# Patient Record
Sex: FEMALE | Race: WHITE | NOT HISPANIC OR LATINO | Employment: OTHER | URBAN - METROPOLITAN AREA
[De-identification: names, ages, dates, MRNs, and addresses within clinical notes are randomized per-mention and may not be internally consistent; named-entity substitution may affect disease eponyms.]

---

## 2023-11-28 DIAGNOSIS — I10 HYPERTENSION, UNSPECIFIED TYPE: Primary | ICD-10-CM

## 2023-11-28 RX ORDER — DIGOXIN 125 MCG
125 TABLET ORAL
COMMUNITY
Start: 2023-05-22 | End: 2023-11-28 | Stop reason: SDUPTHER

## 2023-11-28 RX ORDER — DIGOXIN 125 MCG
125 TABLET ORAL
Qty: 90 TABLET | Refills: 3 | Status: SHIPPED | OUTPATIENT
Start: 2023-11-28 | End: 2024-11-27

## 2023-12-04 DIAGNOSIS — I10 HYPERTENSION, UNSPECIFIED TYPE: ICD-10-CM

## 2023-12-05 NOTE — TELEPHONE ENCOUNTER
CALLED [facility] FOR A 2 WEEK SUPPLY UNTIL MAIL ORDER COMES SPOKE WITH PHARMACIST AYSHA LEFT VOICEMAIL TO INFORM PATIENT [facility] [address] WILL HAVE HER MEDICATION

## 2024-01-25 PROBLEM — H90.3 BILATERAL SENSORINEURAL HEARING LOSS: Status: ACTIVE | Noted: 2024-01-25

## 2024-01-25 PROBLEM — M79.89 SWELLING OF LOWER EXTREMITY: Status: ACTIVE | Noted: 2024-01-25

## 2024-01-25 PROBLEM — I49.3 PVC'S (PREMATURE VENTRICULAR CONTRACTIONS): Status: ACTIVE | Noted: 2024-01-25

## 2024-01-25 PROBLEM — E78.00 PURE HYPERCHOLESTEROLEMIA: Status: ACTIVE | Noted: 2024-01-25

## 2024-01-25 PROBLEM — M19.90 OSTEOARTHRITIS: Status: ACTIVE | Noted: 2024-01-25

## 2024-01-25 PROBLEM — D12.6 ADENOMATOUS POLYP OF COLON: Status: ACTIVE | Noted: 2024-01-25

## 2024-01-25 PROBLEM — I10 HYPERTENSION: Status: ACTIVE | Noted: 2024-01-25

## 2024-01-25 PROBLEM — M20.40 HAMMER TOE: Status: ACTIVE | Noted: 2024-01-25

## 2024-01-25 PROBLEM — M25.569 KNEE PAIN: Status: ACTIVE | Noted: 2024-01-25

## 2024-01-25 PROBLEM — I48.91 ATRIAL FIBRILLATION (MULTI): Status: ACTIVE | Noted: 2024-01-25

## 2024-01-25 PROBLEM — I31.39 PERICARDIAL EFFUSION (HHS-HCC): Status: ACTIVE | Noted: 2024-01-25

## 2024-01-25 PROBLEM — M79.673 FOOT PAIN: Status: ACTIVE | Noted: 2024-01-25

## 2024-01-25 PROBLEM — R63.4 WEIGHT LOSS: Status: ACTIVE | Noted: 2024-01-25

## 2024-01-25 PROBLEM — R55 SYNCOPE: Status: ACTIVE | Noted: 2024-01-25

## 2024-01-25 PROBLEM — H61.21 IMPACTED CERUMEN OF RIGHT EAR: Status: ACTIVE | Noted: 2024-01-25

## 2024-01-25 PROBLEM — M20.10 HALLUX VALGUS: Status: ACTIVE | Noted: 2024-01-25

## 2024-01-25 PROBLEM — Q43.8 TORTUOUS COLON: Status: ACTIVE | Noted: 2024-01-25

## 2024-01-25 PROBLEM — Z85.3 HISTORY OF LEFT BREAST CANCER: Status: ACTIVE | Noted: 2024-01-25

## 2024-01-31 ENCOUNTER — OFFICE VISIT (OUTPATIENT)
Dept: CARDIOLOGY | Facility: CLINIC | Age: 89
End: 2024-01-31
Payer: MEDICARE

## 2024-01-31 VITALS
OXYGEN SATURATION: 97 % | BODY MASS INDEX: 23.44 KG/M2 | SYSTOLIC BLOOD PRESSURE: 150 MMHG | DIASTOLIC BLOOD PRESSURE: 82 MMHG | WEIGHT: 120 LBS | HEART RATE: 74 BPM

## 2024-01-31 DIAGNOSIS — J90 PLEURAL EFFUSION: ICD-10-CM

## 2024-01-31 DIAGNOSIS — I49.3 PVC'S (PREMATURE VENTRICULAR CONTRACTIONS): ICD-10-CM

## 2024-01-31 DIAGNOSIS — I10 HYPERTENSION, UNSPECIFIED TYPE: Primary | ICD-10-CM

## 2024-01-31 DIAGNOSIS — M79.89 SWELLING OF LOWER EXTREMITY: ICD-10-CM

## 2024-01-31 DIAGNOSIS — I31.39 PERICARDIAL EFFUSION (HHS-HCC): ICD-10-CM

## 2024-01-31 DIAGNOSIS — E78.00 PURE HYPERCHOLESTEROLEMIA: ICD-10-CM

## 2024-01-31 DIAGNOSIS — I48.91 ATRIAL FIBRILLATION, UNSPECIFIED TYPE (MULTI): ICD-10-CM

## 2024-01-31 PROCEDURE — 1159F MED LIST DOCD IN RCRD: CPT | Performed by: INTERNAL MEDICINE

## 2024-01-31 PROCEDURE — 1036F TOBACCO NON-USER: CPT | Performed by: INTERNAL MEDICINE

## 2024-01-31 PROCEDURE — 3079F DIAST BP 80-89 MM HG: CPT | Performed by: INTERNAL MEDICINE

## 2024-01-31 PROCEDURE — 3077F SYST BP >= 140 MM HG: CPT | Performed by: INTERNAL MEDICINE

## 2024-01-31 PROCEDURE — 1160F RVW MEDS BY RX/DR IN RCRD: CPT | Performed by: INTERNAL MEDICINE

## 2024-01-31 PROCEDURE — 99214 OFFICE O/P EST MOD 30 MIN: CPT | Performed by: INTERNAL MEDICINE

## 2024-01-31 RX ORDER — METOPROLOL TARTRATE 50 MG/1
25 TABLET ORAL 2 TIMES DAILY
COMMUNITY
Start: 2023-02-18

## 2024-01-31 RX ORDER — PREDNISONE 2.5 MG/1
2.5 TABLET ORAL DAILY
COMMUNITY
Start: 2023-02-03

## 2024-01-31 RX ORDER — FUROSEMIDE 20 MG/1
20 TABLET ORAL
COMMUNITY
Start: 2023-12-26 | End: 2024-01-31 | Stop reason: ALTCHOICE

## 2024-01-31 RX ORDER — DUPILUMAB 300 MG/2ML
300 INJECTION, SOLUTION SUBCUTANEOUS
COMMUNITY
Start: 2023-02-16

## 2024-01-31 RX ORDER — ASPIRIN 81 MG/1
81 TABLET ORAL
COMMUNITY
Start: 2023-11-08

## 2024-01-31 RX ORDER — ATORVASTATIN CALCIUM 10 MG/1
10 TABLET, FILM COATED ORAL DAILY
COMMUNITY
Start: 2023-12-15

## 2024-01-31 RX ORDER — DENOSUMAB 60 MG/ML
60 INJECTION SUBCUTANEOUS
COMMUNITY
Start: 2023-02-03

## 2024-01-31 RX ORDER — POTASSIUM CHLORIDE 750 MG/1
TABLET, EXTENDED RELEASE ORAL
COMMUNITY
Start: 2024-01-26 | End: 2024-01-31 | Stop reason: ALTCHOICE

## 2024-01-31 RX ORDER — MULTIVITAMIN
TABLET ORAL
COMMUNITY

## 2024-01-31 RX ORDER — MULTIVIT-MIN/FA/LYCOPEN/LUTEIN .4-300-25
1 TABLET ORAL DAILY
COMMUNITY

## 2024-01-31 RX ORDER — DOXYCYCLINE 100 MG/1
CAPSULE ORAL
COMMUNITY
Start: 2024-01-29

## 2024-01-31 NOTE — PROGRESS NOTES
Chief Complaint:   Follow-up (Patient states she is here for a 6 month follow up.)     History Of Present Illness:    Mihaela Calderon is a 89 y.o. female presenting with CV dz .  Was put on diuretic by primary care-has excess urination.  Patient denies chest pain/SOB/palpitations/dizziness/lightheadedness/edema/claudication  Active-walks to meals with walker     Last Recorded Vitals:  Vitals:    01/31/24 1103 01/31/24 1138   BP: (!) 150/100 150/82   BP Location: Right arm    Patient Position: Sitting    BP Cuff Size: Adult    Pulse: 74    SpO2: 97%    Weight: 54.4 kg (120 lb)             Allergies:  Patient has no known allergies.    Outpatient Medications:  Current Outpatient Medications   Medication Instructions    aspirin 81 mg    atorvastatin (LIPITOR) 10 mg, oral, Daily    Bacillus coagulans-inulin 1 billion-250 cell-mg capsule     calcium carbonate-vitamin D3 (Calcium 600 + D,3,) 600 mg-10 mcg (400 unit) tablet     digoxin (LANOXIN) 125 mcg, oral, Daily RT    doxycycline (Monodox) 100 mg capsule     Dupixent Syringe 300 mg    metoprolol tartrate (LOPRESSOR) 25 mg, oral, 2 times daily    multivitamin with minerals iron-free (Centrum Silver) 1 tablet, oral, Daily    predniSONE (DELTASONE) 2.5 mg, oral, Daily    Prolia 60 mg, subcutaneous       Physical Exam:  Constitutional:       General: Awake.      Appearance: Not in distress. Frail.   Neck:      Vascular: No JVR. JVD normal.   Pulmonary:      Effort: Pulmonary effort is normal.      Breath sounds: Normal breath sounds. No wheezing. No rhonchi. No rales.   Chest:      Chest wall: Not tender to palpatation.   Cardiovascular:      PMI at left midclavicular line. Normal rate. Irregularly irregular rhythm. Normal S1. Normal S2.       Murmurs: There is no murmur.      No gallop.  No click. No rub.   Pulses:     Intact distal pulses.   Edema:     Peripheral edema absent.   Abdominal:      General: Bowel sounds are normal.      Palpations: Abdomen is soft.       Tenderness: There is no abdominal tenderness.   Musculoskeletal: Normal range of motion.         General: No tenderness.      Comments: Uses walker Skin:     General: Skin is warm and dry.   Neurological:      General: No focal deficit present.      Mental Status: Alert and oriented to person, place and time.          Last Labs:  CBC -  Lab Results   Component Value Date    WBC 6.0 10/25/2019    HGB 12.6 10/25/2019    HCT 39.4 10/25/2019    MCV 97 10/25/2019     10/25/2019       CMP -  Lab Results   Component Value Date    CALCIUM 9.3 10/25/2019    PROT 6.8 10/25/2019    ALBUMIN 4.6 10/25/2019    AST 25 10/25/2019    ALT 19 10/25/2019    ALKPHOS 37 10/25/2019    BILITOT 0.5 10/25/2019       LIPID PANEL -   Lab Results   Component Value Date    CHOL 199 03/12/2019    TRIG 76 03/12/2019    HDL 72.6 03/12/2019    CHHDL 2.7 03/12/2019    LDLF 111 (H) 03/12/2019    VLDL 15 03/12/2019       RENAL FUNCTION PANEL -   Lab Results   Component Value Date    GLUCOSE 90 10/25/2019     (L) 10/25/2019    K 4.0 10/25/2019     10/25/2019    CO2 30 10/25/2019    ANIONGAP 8 (L) 10/25/2019    BUN 18 10/25/2019    CREATININE 0.84 10/25/2019    CALCIUM 9.3 10/25/2019    ALBUMIN 4.6 10/25/2019        Lab Results   Component Value Date    HGBA1C 6.2 03/12/2019           Lab review: I have personally reviewed the laboratory result(s)       Problem List Items Addressed This Visit       Hypertension - Primary    Overview     On beta blocker   BP mildly elevated in office today but generally lower at home, thus no med changes made          Pure hypercholesterolemia    Overview     No definite statin indication as age of 89 with no DM / CAD / LDL over 190           PVC's (premature ventricular contractions)    Overview     Asymptomatic - on beta blocker          Relevant Medications    metoprolol tartrate (Lopressor) 50 mg tablet    Atrial fibrillation (CMS/HCC)    Overview     Newly diagnosed on 2/15/2023 hospital admit    Asymptomatic and no signs of CHF   On digoxin / beta blocker / ASA(stopped AC with pericardial effusion)         Relevant Medications    metoprolol tartrate (Lopressor) 50 mg tablet    Pericardial effusion    Overview     Moderate per 2/16/2023 echo   Persistent per 12/2023 echo  No signs of tamponade per exam / echo   Note: did stop Eliquis  Follow clinically          Swelling of lower extremity    Overview     Improved         Pleural effusion    Overview     Asymptomatic    Minimal per 12/2023 CXR  Stop diuretic            Can stop Lasix and potassium-excess urination    Connor Parish, DO

## 2024-07-03 ENCOUNTER — APPOINTMENT (OUTPATIENT)
Dept: CARDIOLOGY | Facility: CLINIC | Age: 89
End: 2024-07-03
Payer: MEDICARE

## 2024-07-03 VITALS
DIASTOLIC BLOOD PRESSURE: 65 MMHG | SYSTOLIC BLOOD PRESSURE: 130 MMHG | BODY MASS INDEX: 25.15 KG/M2 | HEART RATE: 78 BPM | OXYGEN SATURATION: 98 % | WEIGHT: 128.8 LBS

## 2024-07-03 DIAGNOSIS — I48.91 ATRIAL FIBRILLATION, UNSPECIFIED TYPE (MULTI): ICD-10-CM

## 2024-07-03 DIAGNOSIS — I31.39 PERICARDIAL EFFUSION (HHS-HCC): ICD-10-CM

## 2024-07-03 DIAGNOSIS — Z79.82 CURRENT USE OF ASPIRIN: ICD-10-CM

## 2024-07-03 DIAGNOSIS — I26.99 PULMONARY EMBOLISM, OTHER, UNSPECIFIED CHRONICITY, UNSPECIFIED WHETHER ACUTE COR PULMONALE PRESENT (MULTI): ICD-10-CM

## 2024-07-03 DIAGNOSIS — M79.89 SWELLING OF LOWER EXTREMITY: ICD-10-CM

## 2024-07-03 DIAGNOSIS — I10 HYPERTENSION, UNSPECIFIED TYPE: Primary | ICD-10-CM

## 2024-07-03 PROBLEM — L10.9 PEMPHIGUS (MULTI): Status: ACTIVE | Noted: 2024-02-19

## 2024-07-03 PROBLEM — I50.31 ACUTE DIASTOLIC HEART FAILURE (MULTI): Status: ACTIVE | Noted: 2024-07-03

## 2024-07-03 PROBLEM — Z09 HOSPITAL DISCHARGE FOLLOW-UP: Status: ACTIVE | Noted: 2024-07-03

## 2024-07-03 PROBLEM — L65.9 HAIR LOSS: Status: ACTIVE | Noted: 2024-07-03

## 2024-07-03 PROBLEM — J30.9 ALLERGIC RHINITIS: Status: ACTIVE | Noted: 2024-07-03

## 2024-07-03 PROBLEM — M81.0 OSTEOPOROSIS: Status: ACTIVE | Noted: 2024-02-19

## 2024-07-03 PROBLEM — C50.919 MALIGNANT NEOPLASM OF BREAST (MULTI): Status: ACTIVE | Noted: 2024-02-19

## 2024-07-03 PROBLEM — D64.9 ANEMIA: Status: ACTIVE | Noted: 2024-06-15

## 2024-07-03 PROBLEM — E83.39 HYPOPHOSPHATEMIA: Status: ACTIVE | Noted: 2024-06-15

## 2024-07-03 PROCEDURE — 1036F TOBACCO NON-USER: CPT | Performed by: INTERNAL MEDICINE

## 2024-07-03 PROCEDURE — 99215 OFFICE O/P EST HI 40 MIN: CPT | Performed by: INTERNAL MEDICINE

## 2024-07-03 PROCEDURE — 3078F DIAST BP <80 MM HG: CPT | Performed by: INTERNAL MEDICINE

## 2024-07-03 PROCEDURE — 3075F SYST BP GE 130 - 139MM HG: CPT | Performed by: INTERNAL MEDICINE

## 2024-07-03 PROCEDURE — 1159F MED LIST DOCD IN RCRD: CPT | Performed by: INTERNAL MEDICINE

## 2024-07-03 RX ORDER — PANTOPRAZOLE SODIUM 40 MG/1
40 TABLET, DELAYED RELEASE ORAL
COMMUNITY
Start: 2024-06-20

## 2024-07-03 RX ORDER — PREDNISOLONE ACETATE 10 MG/ML
SUSPENSION/ DROPS OPHTHALMIC
COMMUNITY
Start: 2024-04-23 | End: 2024-07-03

## 2024-07-03 RX ORDER — MULTIVITAMIN/IRON/FOLIC ACID 18MG-0.4MG
1 TABLET ORAL
COMMUNITY
End: 2024-07-03 | Stop reason: SDUPTHER

## 2024-07-03 RX ORDER — TACROLIMUS 1 MG/G
OINTMENT TOPICAL
COMMUNITY
Start: 2022-08-01 | End: 2024-07-03 | Stop reason: WASHOUT

## 2024-07-03 RX ORDER — MOXIFLOXACIN 5 MG/ML
SOLUTION/ DROPS OPHTHALMIC
COMMUNITY
Start: 2024-04-23

## 2024-07-03 RX ORDER — MELOXICAM 15 MG/1
TABLET ORAL
COMMUNITY

## 2024-07-03 RX ORDER — LYSINE HCL 500 MG
TABLET ORAL
COMMUNITY

## 2024-07-03 RX ORDER — HYALURONATE SODIUM, STABILIZED 60 MG/3 ML
SYRINGE (ML) INTRAARTICULAR
COMMUNITY
Start: 2024-03-22 | End: 2024-07-03 | Stop reason: SDUPTHER

## 2024-07-03 RX ORDER — DEXTROMETHORPHAN HYDROBROMIDE, GUAIFENESIN 5; 100 MG/5ML; MG/5ML
LIQUID ORAL
COMMUNITY

## 2024-07-03 NOTE — PROGRESS NOTES
Cardiology Chief Complaint:   Hospital Follow-up (Patient is here for a hospital follow up)     History Of Present Illness:    Mihaela Calderon is a 89 y.o. female presenting after hospital discharge  Patient was admitted to [facility] 6/13/2024 with acute pulmonary embolism.  She was started on anticoagulation  Now feels better  Some MARIE  No CP/palpitations/dizziness/edema  Activity limited  No bleeding with Eliquis           Last Recorded Vitals:  Vitals:    07/03/24 0930 07/03/24 1003   BP: 146/79 130/65   BP Location: Right arm    Patient Position: Sitting    BP Cuff Size: Adult    Pulse: 78    SpO2: 98%    Weight: 58.4 kg (128 lb 12.8 oz)             Allergies:  Patient has no known allergies.    Outpatient Medications:  Current Outpatient Medications   Medication Instructions    acetaminophen (Tylenol 8 HOUR) 650 mg ER tablet oral    apixaban (ELIQUIS) 5 mg, oral, 2 times daily    aspirin 81 mg    atorvastatin (LIPITOR) 10 mg, oral, Daily    Bacillus coagulans-inulin 1 billion-250 cell-mg capsule     calcium carbonate-vit D3-min 600 mg calcium- 400 unit tablet     digoxin (LANOXIN) 125 mcg, oral, Daily RT    doxycycline (Monodox) 100 mg capsule     Dupixent Syringe 300 mg    lactobacillus (Culturelle) 10 billion cell capsule 1 capsule, oral, Daily RT    meloxicam (Mobic) 15 mg tablet oral    metoprolol tartrate (LOPRESSOR) 50 mg, oral, 2 times daily    moxifloxacin (Vigamox) 0.5 % ophthalmic solution INSTILL 1 DROP IN THE LEFT EYE FOUR TIMES DAILY    multivitamin with minerals iron-free (Centrum Silver) 1 tablet, oral, Daily    pantoprazole (PROTONIX) 40 mg    predniSONE (DELTASONE) 2.5 mg, oral, Daily    Prolia 60 mg, subcutaneous    vit A/vit C/vit E/zinc/copper (PRESERVISION AREDS ORAL) oral       Physical Exam:  Constitutional:       General: Awake.      Appearance: Not in distress. Frail.   Neck:      Vascular: No JVR. JVD normal.   Pulmonary:      Effort: Pulmonary effort is normal.       Breath sounds: Normal breath sounds. No wheezing. No rhonchi. No rales.   Chest:      Chest wall: Not tender to palpatation.   Cardiovascular:      PMI at left midclavicular line. Normal rate. Irregularly irregular rhythm. Normal S1. Normal S2.       Murmurs: There is no murmur.      No gallop.  No click. No rub.   Pulses:     Intact distal pulses.   Edema:     Peripheral edema absent.   Abdominal:      General: Bowel sounds are normal.      Palpations: Abdomen is soft.      Tenderness: There is no abdominal tenderness.   Musculoskeletal: Normal range of motion.         General: No tenderness.      Comments: Uses walker Skin:     General: Skin is warm and dry.   Neurological:      General: No focal deficit present.      Mental Status: Alert and oriented to person, place and time.          Last Labs:  CBC -  Lab Results   Component Value Date    WBC 6.0 10/25/2019    HGB 12.6 10/25/2019    HCT 39.4 10/25/2019    MCV 97 10/25/2019     10/25/2019       CMP -  Lab Results   Component Value Date    CALCIUM 9.3 10/25/2019    PROT 6.8 10/25/2019    ALBUMIN 4.6 10/25/2019    AST 25 10/25/2019    ALT 19 10/25/2019    ALKPHOS 37 10/25/2019    BILITOT 0.5 10/25/2019       LIPID PANEL -   Lab Results   Component Value Date    CHOL 199 03/12/2019    TRIG 76 03/12/2019    HDL 72.6 03/12/2019    CHHDL 2.7 03/12/2019    LDLF 111 (H) 03/12/2019    VLDL 15 03/12/2019       RENAL FUNCTION PANEL -   Lab Results   Component Value Date    GLUCOSE 90 10/25/2019     (L) 10/25/2019    K 4.0 10/25/2019     10/25/2019    CO2 30 10/25/2019    ANIONGAP 8 (L) 10/25/2019    BUN 18 10/25/2019    CREATININE 0.84 10/25/2019    CALCIUM 9.3 10/25/2019    ALBUMIN 4.6 10/25/2019        Lab Results   Component Value Date    HGBA1C 6.2 03/12/2019           Lab review: I have personally reviewed the laboratory result(s)       Problem List Items Addressed This Visit       Hypertension - Primary    Overview     On beta blocker   Blood  pressure stable         Relevant Orders    Follow Up In Cardiology    Atrial fibrillation (Multi)    Overview     Newly diagnosed on 2/15/2023 hospital admit   Asymptomatic and no signs of CHF   On digoxin / beta blocker /Eliquis (stopped AC with pericardial effusion-however was resumed in the setting of 6/13/2024 pulmonary embolism)         Pericardial effusion (HHS-HCC)    Overview     Moderate per 2/16/2023 echo   Persistent per 12/2023 echo-had stopped anticoagulation which she was on for A-fib.  Was restarted on anticoagulation 6/13/2024 because of pulmonary embolism  6/14/2024 echo with moderate to large pericardial effusion  No signs of tamponade per exam / echo     Follow clinically          Swelling of lower extremity    Overview     Improved         Pulmonary embolism (Multi)    Overview     Admitted to [facility] 6/13/2024 with bilateral PE.  Now on Eliquis  She is hemodynamically stable with no signs of right heart failure         Current use of aspirin    Overview     Will discontinue aspirin as she is now on Eliquis for pulmonary embolism.  In light of advanced age has high bleeding risk          Stop aspirin      Connor Parish, DO

## 2024-07-10 ENCOUNTER — EXTERNAL HOSPITAL ADMISSION (OUTPATIENT)
Dept: CARDIOLOGY | Facility: CLINIC | Age: 89
End: 2024-07-10
Payer: MEDICARE

## 2024-07-11 DIAGNOSIS — I48.91 ATRIAL FIBRILLATION, UNSPECIFIED TYPE (MULTI): ICD-10-CM

## 2024-07-17 ENCOUNTER — TELEPHONE (OUTPATIENT)
Dept: CARDIOLOGY | Facility: CLINIC | Age: 89
End: 2024-07-17
Payer: MEDICARE

## 2024-07-17 NOTE — TELEPHONE ENCOUNTER
Pt's dtr called stating pt was at [facility] ER on 7/10 and was discharged with Lasix 20mg daily. Pt had CT of chest & A/P done and wanted Dr. Parish to review to see if he had any concerns (see inbox). Pt's dtr denies SOB, and states edema is improving. Pt's next OV: 8/5/24.

## 2024-07-17 NOTE — TELEPHONE ENCOUNTER
Per Dr. Parish: Patient does have fluid around the heart and in the lungs however these are stable compared to recent testing at [facility].  As long as she is feeling better we will continue to monitor     Spoke to pt's dtr and made aware. Instructed to call office with any questions/concerns.

## 2024-07-25 DIAGNOSIS — I48.91 ATRIAL FIBRILLATION, UNSPECIFIED TYPE (MULTI): ICD-10-CM

## 2024-07-31 PROBLEM — I50.9 ACUTE ON CHRONIC CONGESTIVE HEART FAILURE (MULTI): Status: ACTIVE | Noted: 2024-07-10

## 2024-07-31 PROBLEM — K55.1 MESENTERIC ARTERY STENOSIS (MULTI): Status: ACTIVE | Noted: 2024-07-10

## 2024-07-31 PROBLEM — N39.0 UTI (URINARY TRACT INFECTION): Status: ACTIVE | Noted: 2024-07-10

## 2024-08-05 ENCOUNTER — APPOINTMENT (OUTPATIENT)
Dept: CARDIOLOGY | Facility: CLINIC | Age: 89
End: 2024-08-05
Payer: MEDICARE

## 2024-08-05 VITALS
BODY MASS INDEX: 25.97 KG/M2 | SYSTOLIC BLOOD PRESSURE: 130 MMHG | HEART RATE: 78 BPM | DIASTOLIC BLOOD PRESSURE: 70 MMHG | OXYGEN SATURATION: 95 % | WEIGHT: 133 LBS

## 2024-08-05 DIAGNOSIS — I48.91 ATRIAL FIBRILLATION, UNSPECIFIED TYPE (MULTI): ICD-10-CM

## 2024-08-05 DIAGNOSIS — I31.39 PERICARDIAL EFFUSION (HHS-HCC): ICD-10-CM

## 2024-08-05 DIAGNOSIS — Z79.82 CURRENT USE OF ASPIRIN: ICD-10-CM

## 2024-08-05 DIAGNOSIS — I26.99 PULMONARY EMBOLISM, OTHER, UNSPECIFIED CHRONICITY, UNSPECIFIED WHETHER ACUTE COR PULMONALE PRESENT (MULTI): ICD-10-CM

## 2024-08-05 DIAGNOSIS — E78.00 PURE HYPERCHOLESTEROLEMIA: Primary | ICD-10-CM

## 2024-08-05 DIAGNOSIS — I10 HYPERTENSION, UNSPECIFIED TYPE: ICD-10-CM

## 2024-08-05 PROCEDURE — 1159F MED LIST DOCD IN RCRD: CPT | Performed by: INTERNAL MEDICINE

## 2024-08-05 PROCEDURE — 99214 OFFICE O/P EST MOD 30 MIN: CPT | Performed by: INTERNAL MEDICINE

## 2024-08-05 PROCEDURE — 1036F TOBACCO NON-USER: CPT | Performed by: INTERNAL MEDICINE

## 2024-08-05 PROCEDURE — 3075F SYST BP GE 130 - 139MM HG: CPT | Performed by: INTERNAL MEDICINE

## 2024-08-05 PROCEDURE — G2211 COMPLEX E/M VISIT ADD ON: HCPCS | Performed by: INTERNAL MEDICINE

## 2024-08-05 PROCEDURE — 3078F DIAST BP <80 MM HG: CPT | Performed by: INTERNAL MEDICINE

## 2024-08-05 NOTE — PROGRESS NOTES
Cardiology Chief Complaint:   Follow-up (Patient is here for a follow up/)     History Of Present Illness:    Mihaela Calderon is a 89 y.o. female presenting with cardiovascular disease  Was seen in the emergency department [facility] 7/10/2024 with generalized weakness.  Feels tired  Has intermittent SOB-no cough/wheeze  Has had some ankle edema  No CP/palpitations/dizziness  Activity limited-uses walker/wheel chair  No bleeding with Eliquis           Last Recorded Vitals:  Vitals:    08/05/24 1015 08/05/24 1047   BP: 152/89 130/70   BP Location: Right arm    Patient Position: Sitting    BP Cuff Size: Adult    Pulse: 78    SpO2: 95%    Weight: 60.3 kg (133 lb)             Allergies:  Patient has no known allergies.    Outpatient Medications:  Current Outpatient Medications   Medication Instructions    acetaminophen (Tylenol 8 HOUR) 650 mg ER tablet oral    apixaban (ELIQUIS) 5 mg, oral, 2 times daily    calcium carbonate-vit D3-min 600 mg calcium- 400 unit tablet     digoxin (LANOXIN) 125 mcg, oral, Daily RT    doxycycline (Monodox) 100 mg capsule     Dupixent Syringe 300 mg    lactobacillus (Culturelle) 10 billion cell capsule 1 capsule, oral, Daily RT    metoprolol tartrate (LOPRESSOR) 50 mg, oral, 2 times daily    multivitamin with minerals iron-free (Centrum Silver) 1 tablet, oral, Daily    predniSONE (DELTASONE) 2.5 mg, oral, Daily    Prolia 60 mg, subcutaneous    vit A/vit C/vit E/zinc/copper (PRESERVISION AREDS ORAL) oral       Physical Exam:  Constitutional:       General: Awake.      Appearance: Not in distress. Frail.   Neck:      Vascular: No JVR. JVD normal.   Pulmonary:      Effort: Pulmonary effort is normal.      Breath sounds: Normal breath sounds. No wheezing. No rhonchi. No rales.   Chest:      Chest wall: Not tender to palpatation.   Cardiovascular:      PMI at left midclavicular line. Normal rate. Irregularly irregular rhythm. Normal S1. Normal S2.       Murmurs: There is no murmur.      No  gallop.  No click. No rub.   Pulses:     Intact distal pulses.   Edema:     Peripheral edema absent.   Abdominal:      General: Bowel sounds are normal.      Palpations: Abdomen is soft.      Tenderness: There is no abdominal tenderness.   Musculoskeletal: Normal range of motion.         General: No tenderness.      Comments: Uses walker Skin:     General: Skin is warm and dry.   Neurological:      General: No focal deficit present.      Mental Status: Alert and oriented to person, place and time.          Last Labs:  CBC -  Lab Results   Component Value Date    WBC 6.0 10/25/2019    HGB 12.6 10/25/2019    HCT 39.4 10/25/2019    MCV 97 10/25/2019     10/25/2019       CMP -  Lab Results   Component Value Date    CALCIUM 9.3 10/25/2019    PROT 6.8 10/25/2019    ALBUMIN 4.6 10/25/2019    AST 25 10/25/2019    ALT 19 10/25/2019    ALKPHOS 37 10/25/2019    BILITOT 0.5 10/25/2019       LIPID PANEL -   Lab Results   Component Value Date    CHOL 199 03/12/2019    TRIG 76 03/12/2019    HDL 72.6 03/12/2019    CHHDL 2.7 03/12/2019    LDLF 111 (H) 03/12/2019    VLDL 15 03/12/2019       RENAL FUNCTION PANEL -   Lab Results   Component Value Date    GLUCOSE 90 10/25/2019     (L) 10/25/2019    K 4.0 10/25/2019     10/25/2019    CO2 30 10/25/2019    ANIONGAP 8 (L) 10/25/2019    BUN 18 10/25/2019    CREATININE 0.84 10/25/2019    CALCIUM 9.3 10/25/2019    ALBUMIN 4.6 10/25/2019        Lab Results   Component Value Date    HGBA1C 6.2 03/12/2019           Lab review: I have personally reviewed the laboratory result(s)       Problem List Items Addressed This Visit       Hypertension    Overview     On beta blocker   Blood pressure stable         Pure hypercholesterolemia - Primary    Overview     No definite statin indication as age of 89 with no DM / CAD / LDL over 190  -stop atorvastatin         Atrial fibrillation (Multi)    Overview     Newly diagnosed on 2/15/2023 hospital admit   Asymptomatic and no signs of CHF    On digoxin / beta blocker /Eliquis (stopped AC with pericardial effusion-however was resumed in the setting of 6/13/2024 pulmonary embolism)         Pericardial effusion (HHS-HCC)    Overview     Moderate per 2/16/2023 echo   Persistent per 12/2023 echo-had stopped anticoagulation which she was on for A-fib.  Was restarted on anticoagulation 6/13/2024 because of pulmonary embolism  6/14/2024 echo with moderate to large pericardial effusion  No signs of tamponade per exam / echo     Follow clinically          Pulmonary embolism (Multi)    Overview     Admitted to [facility] 6/13/2024 with bilateral PE.  Now on Eliquis  She is hemodynamically stable with no signs of right heart failure         Current use of aspirin    Overview     Will discontinue aspirin as she is now on Eliquis for pulmonary embolism.  In light of advanced age has high bleeding risk          Stop aspirin  Stop atorvastatin  Stop pantoprazole  Connor Parish DO

## 2024-08-06 ENCOUNTER — HOSPITAL ENCOUNTER (INPATIENT)
Facility: HOSPITAL | Age: 89
End: 2024-08-06
Attending: INTERNAL MEDICINE | Admitting: INTERNAL MEDICINE
Payer: MEDICARE

## 2025-01-20 ENCOUNTER — LAB REQUISITION (OUTPATIENT)
Dept: LAB | Facility: HOSPITAL | Age: OVER 89
End: 2025-01-20
Payer: MEDICARE

## 2025-01-20 LAB
APPEARANCE UR: ABNORMAL
BILIRUB UR STRIP.AUTO-MCNC: NEGATIVE MG/DL
CAOX CRY #/AREA UR COMP ASSIST: ABNORMAL /HPF
COLOR UR: YELLOW
GLUCOSE UR STRIP.AUTO-MCNC: ABNORMAL MG/DL
KETONES UR STRIP.AUTO-MCNC: NEGATIVE MG/DL
LEUKOCYTE ESTERASE UR QL STRIP.AUTO: NEGATIVE
MUCOUS THREADS #/AREA URNS AUTO: ABNORMAL /LPF
NITRITE UR QL STRIP.AUTO: NEGATIVE
PH UR STRIP.AUTO: 5.5 [PH]
PROT UR STRIP.AUTO-MCNC: ABNORMAL MG/DL
RBC # UR STRIP.AUTO: NEGATIVE /UL
RBC #/AREA URNS AUTO: ABNORMAL /HPF
SP GR UR STRIP.AUTO: 1.03
SQUAMOUS #/AREA URNS AUTO: ABNORMAL /HPF
UROBILINOGEN UR STRIP.AUTO-MCNC: NORMAL MG/DL
WBC #/AREA URNS AUTO: ABNORMAL /HPF

## 2025-01-20 PROCEDURE — 81001 URINALYSIS AUTO W/SCOPE: CPT | Mod: OUT | Performed by: NURSE PRACTITIONER

## 2025-02-10 ENCOUNTER — APPOINTMENT (OUTPATIENT)
Dept: CARDIOLOGY | Facility: CLINIC | Age: OVER 89
End: 2025-02-10
Payer: MEDICARE